# Patient Record
Sex: FEMALE | Race: WHITE | Employment: UNEMPLOYED | URBAN - METROPOLITAN AREA
[De-identification: names, ages, dates, MRNs, and addresses within clinical notes are randomized per-mention and may not be internally consistent; named-entity substitution may affect disease eponyms.]

---

## 2022-08-26 ENCOUNTER — APPOINTMENT (OUTPATIENT)
Dept: GENERAL RADIOLOGY | Age: 52
End: 2022-08-26

## 2022-08-26 ENCOUNTER — HOSPITAL ENCOUNTER (EMERGENCY)
Age: 52
Discharge: HOME | End: 2022-08-26
Attending: EMERGENCY MEDICINE

## 2022-08-26 VITALS
WEIGHT: 194 LBS | RESPIRATION RATE: 16 BRPM | SYSTOLIC BLOOD PRESSURE: 99 MMHG | OXYGEN SATURATION: 98 % | TEMPERATURE: 98.3 F | HEART RATE: 66 BPM | BODY MASS INDEX: 31.18 KG/M2 | DIASTOLIC BLOOD PRESSURE: 63 MMHG | HEIGHT: 66 IN

## 2022-08-26 DIAGNOSIS — T50.901A ACCIDENTAL DRUG OVERDOSE, INITIAL ENCOUNTER: Primary | ICD-10-CM

## 2022-08-26 LAB
GLUCOSE BLD-MCNC: 136 MG/DL (ref 70–99)
PERFORMED ON: ABNORMAL

## 2022-08-26 PROCEDURE — 73130 X-RAY EXAM OF HAND: CPT

## 2022-08-26 PROCEDURE — 99283 EMERGENCY DEPT VISIT LOW MDM: CPT

## 2022-08-26 PROCEDURE — 73562 X-RAY EXAM OF KNEE 3: CPT

## 2022-08-26 ASSESSMENT — ENCOUNTER SYMPTOMS
ABDOMINAL PAIN: 0
COUGH: 0
BACK PAIN: 1
SHORTNESS OF BREATH: 0

## 2022-08-26 ASSESSMENT — PAIN DESCRIPTION - ORIENTATION: ORIENTATION: LOWER

## 2022-08-26 ASSESSMENT — PAIN - FUNCTIONAL ASSESSMENT
PAIN_FUNCTIONAL_ASSESSMENT: ACTIVITIES ARE NOT PREVENTED
PAIN_FUNCTIONAL_ASSESSMENT: 0-10

## 2022-08-26 ASSESSMENT — PAIN DESCRIPTION - FREQUENCY: FREQUENCY: CONTINUOUS

## 2022-08-26 ASSESSMENT — PAIN DESCRIPTION - DESCRIPTORS: DESCRIPTORS: ACHING

## 2022-08-26 ASSESSMENT — PAIN DESCRIPTION - LOCATION: LOCATION: BACK

## 2022-08-26 ASSESSMENT — PAIN DESCRIPTION - PAIN TYPE: TYPE: ACUTE PAIN

## 2022-08-26 ASSESSMENT — PAIN DESCRIPTION - ONSET: ONSET: PROGRESSIVE

## 2022-08-26 ASSESSMENT — PAIN SCALES - GENERAL: PAINLEVEL_OUTOF10: 7

## 2022-08-26 NOTE — ED PROVIDER NOTES
[facility] ENCOUNTER          ATTENDING PHYSICIAN NOTE       Date of evaluation: 2022    Chief Complaint     Drug Overdose (Pt states taking 3x her prescribed tizanidine. )      History of Present Illness     Fermín Gee is a 46 y.o. female who presents with a complaint of accidental overdose. The patient has a history of chronic back and knee pain, and states she was having trouble sleeping so she took 3 tizanidine total 12 mg. After this her roommate found her somnolent and unresponsive. She called 911 and patient came to the ER. She was not given Narcan on scene and there is no opiate component to her accidental overdose. The patient states she did not intend to harm her self, and regrets taking this much medication. She states that she is had chronic pain that has been refractory to joint injections and other multimodal pain control and she simply took this medicine to try to get her pain to joyce so that she could sleep    Review of Systems     Review of Systems   Constitutional:  Negative for chills, fatigue and fever. Respiratory:  Negative for cough and shortness of breath. Cardiovascular:  Negative for chest pain and palpitations. Gastrointestinal:  Negative for abdominal pain. Musculoskeletal:  Positive for arthralgias, back pain and gait problem. Psychiatric/Behavioral:  Positive for decreased concentration and sleep disturbance. Negative for behavioral problems, hallucinations and suicidal ideas. The patient is not hyperactive. All other systems reviewed and are negative. Past Medical, Surgical, Family, and Social History     She has a past medical history of Hypertension and Overdose. She has a past surgical history that includes  section; fracture surgery; and Cholecystectomy (). Her family history is not on file. She reports that she has been smoking cigarettes. She has been smoking an average of .5 packs per day.  She has never used smokeless tobacco. She reports that she does not drink alcohol and does not use drugs. Medications     Discharge Medication List as of 8/26/2022  4:42 AM        CONTINUE these medications which have NOT CHANGED    Details   lidocaine (LIDODERM) 5 % Place 1 patch onto the skin daily 12 hours on, 12 hours off., Disp-30 patch, R-0Print      atorvastatin (LIPITOR) 10 MG tablet Take 10 mg by mouth dailyHistorical Med      ibuprofen (ADVIL;MOTRIN) 800 MG tablet Take 800 mg by mouth every 6 hours as needed for PainHistorical Med      methylPREDNISolone (MEDROL DOSEPACK) 4 MG tablet Take by mouth., Disp-1 kit, R-0Print      hydrochlorothiazide (HYDRODIURIL) 25 MG tablet Take 25 mg by mouth daily      Vitamin D, Cholecalciferol, 1000 UNITS CAPS Take 4,000 Units by mouth      lisinopril (PRINIVIL;ZESTRIL) 40 MG tablet Take 40 mg by mouth daily             Allergies     She has No Known Allergies. Physical Exam     INITIAL VITALS: BP: 99/63, Temp: 98.3 °F (36.8 °C), Heart Rate: 66, Resp: 16, SpO2: 92 %   Physical Exam  Constitutional: Middle-aged  female who appears sleepy, somewhat intoxicated, but arousable to verbal stimulus    HEENT: NC/AT. PERRL 4-2 bilaterally. EOMI. CV:Heart is regular rate and rhythm without murmurs, rubs or gallops. Resp: Respirations unlabored. Lungs clear to auscultation w/o wheezing. Abd: Soft, nontender, nondistended. MSK: Full ROM, no edema or tenderness to palpation. He is complaining of pain to her bilateral hands and has a nodule on her right thumb that is nonerythematous, located along the right MCP joint, without any crepitus or external signs of trauma. Skin: Extremities are warm and well perfused. 2+ radial pulses . Cap Refill <3 seconds. Neuro: She is oriented to person, location, and situation. However, she is slow to respond some questions the difficulty recalling details of her medical history.   She follows commands in all extremities with repetitive cues. She has no lateralizing weakness. Psych: Her overall affect is somewhat depressed but she denies suicidal intent. She is well-groomed and well-appearing. She does not attend to external stimuli    Diagnostic Results         RADIOLOGY:  XR KNEE RIGHT (3 VIEWS)   Final Result   1. Right total knee arthroplasty. 2.  No evidence of acute fracture or dislocation. XR HAND RIGHT (MIN 3 VIEWS)   Final Result     The phalanges, metacarpal, and carpal bones are intact and    normally aligned. No acute fracture or dislocation is seen. LABS:   Results for orders placed or performed during the hospital encounter of 08/26/22   POCT Glucose   Result Value Ref Range    POC Glucose 136 (H) 70 - 99 mg/dl    Performed on ACCU-CHEK        ED BEDSIDE ULTRASOUND:  No results found. RECENT VITALS:  BP: 99/63,Temp: 98.3 °F (36.8 °C), Heart Rate: 66, Resp: 16, SpO2: 98 %     Procedures         ED Course     Nursing Notes, Past Medical Hx, Past Surgical Hx, Social Hx,Allergies, and Family Hx were reviewed. patient was given the following medications:  No orders of the defined types were placed in this encounter. CONSULTS:  None    MEDICAL DECISIONMAKING / ASSESSMENT / PLAN     Allen Riojas is a 46 y.o. female presenting with complaint of accidental overdose. She accidentally took more for tizanidine and prescribed to try to go to sleep. She has no sign of suicidal ingestion or intent. Fingerstick blood glucose was unremarkable. At patient's request we did obtain x-rays of her right hand and right knee to evaluate chronic problems but these were unremarkable. She achieved clinical sobriety, and as this appears to be an accidental overdose, she was discharged. Clinical Impression     1.  Accidental drug overdose, initial encounter        Disposition     PATIENT REFERRED TO:  [facility] Emergency Department  [address] [facility] [address]  [phone]    If symptoms worsen    DISCHARGE MEDICATIONS:  Discharge Medication List as of 8/26/2022  4:42 AM          DISPOSITION Decision To Discharge 08/26/2022 04:36:28 AM          Genevieve Soto MD  08/26/22 [id number]

## 2022-10-13 ENCOUNTER — HOSPITAL ENCOUNTER (EMERGENCY)
Age: 52
Discharge: HOME | End: 2022-10-13
Attending: EMERGENCY MEDICINE

## 2022-10-13 ENCOUNTER — APPOINTMENT (OUTPATIENT)
Dept: GENERAL RADIOLOGY | Age: 52
End: 2022-10-13

## 2022-10-13 VITALS
DIASTOLIC BLOOD PRESSURE: 78 MMHG | TEMPERATURE: 98.6 F | SYSTOLIC BLOOD PRESSURE: 150 MMHG | RESPIRATION RATE: 20 BRPM | OXYGEN SATURATION: 98 % | HEART RATE: 82 BPM

## 2022-10-13 DIAGNOSIS — S20.219A CONTUSION OF CHEST WALL, UNSPECIFIED LATERALITY, INITIAL ENCOUNTER: Primary | ICD-10-CM

## 2022-10-13 PROCEDURE — 6360000002 HC RX W HCPCS: Performed by: EMERGENCY MEDICINE

## 2022-10-13 PROCEDURE — 93005 ELECTROCARDIOGRAM TRACING: CPT | Performed by: EMERGENCY MEDICINE

## 2022-10-13 PROCEDURE — 71046 X-RAY EXAM CHEST 2 VIEWS: CPT

## 2022-10-13 PROCEDURE — 96372 THER/PROPH/DIAG INJ SC/IM: CPT

## 2022-10-13 PROCEDURE — 99284 EMERGENCY DEPT VISIT MOD MDM: CPT

## 2022-10-13 RX ORDER — KETOROLAC TROMETHAMINE 15 MG/ML
15 INJECTION, SOLUTION INTRAMUSCULAR; INTRAVENOUS ONCE
Status: COMPLETED | OUTPATIENT
Start: 2022-10-13 | End: 2022-10-13

## 2022-10-13 RX ORDER — METHOCARBAMOL 500 MG/1
500 TABLET, FILM COATED ORAL 3 TIMES DAILY PRN
Qty: 15 TABLET | Refills: 0 | Status: SHIPPED | OUTPATIENT
Start: 2022-10-13 | End: 2022-10-18

## 2022-10-13 RX ORDER — LIDOCAINE 50 MG/G
1 PATCH TOPICAL DAILY
Qty: 10 PATCH | Refills: 0 | Status: SHIPPED | OUTPATIENT
Start: 2022-10-13 | End: 2022-10-23

## 2022-10-13 RX ORDER — PREDNISONE 20 MG/1
20 TABLET ORAL 2 TIMES DAILY
Qty: 10 TABLET | Refills: 0 | Status: SHIPPED | OUTPATIENT
Start: 2022-10-13 | End: 2022-10-18

## 2022-10-13 RX ADMIN — KETOROLAC TROMETHAMINE 15 MG: 15 INJECTION, SOLUTION INTRAMUSCULAR; INTRAVENOUS at 17:36

## 2022-10-13 ASSESSMENT — PAIN DESCRIPTION - ORIENTATION: ORIENTATION: MID

## 2022-10-13 ASSESSMENT — PAIN SCALES - GENERAL
PAINLEVEL_OUTOF10: 7
PAINLEVEL_OUTOF10: 8

## 2022-10-13 ASSESSMENT — PAIN DESCRIPTION - LOCATION
LOCATION: CHEST
LOCATION: CHEST

## 2022-10-13 ASSESSMENT — PAIN DESCRIPTION - DESCRIPTORS: DESCRIPTORS: TENDER

## 2022-10-13 ASSESSMENT — PAIN - FUNCTIONAL ASSESSMENT: PAIN_FUNCTIONAL_ASSESSMENT: 0-10
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
## 2022-10-13 NOTE — DISCHARGE INSTRUCTIONS
Your EKG and chest x-ray are normal.  Your symptoms are consistent with a chest wall contusion. You have been prescribed pain patches, muscle relaxers and steroids to take at home. Take these as prescribed. Rest.  You can also try heating pads to the area. Please follow-up with your primary doctor first thing next week. If you feel like you are worsening at any point or develop new symptoms that concern you, return to the ER immediately.

## 2022-10-14 LAB
EKG ATRIAL RATE: 94 BPM
EKG DIAGNOSIS: NORMAL
EKG P AXIS: 54 DEGREES
EKG P-R INTERVAL: 120 MS
EKG Q-T INTERVAL: 378 MS
EKG QRS DURATION: 98 MS
EKG QTC CALCULATION (BAZETT): 472 MS
EKG R AXIS: 29 DEGREES
EKG T AXIS: 16 DEGREES
EKG VENTRICULAR RATE: 94 BPM